# Patient Record
Sex: MALE | Race: WHITE
[De-identification: names, ages, dates, MRNs, and addresses within clinical notes are randomized per-mention and may not be internally consistent; named-entity substitution may affect disease eponyms.]

---

## 2017-02-18 ENCOUNTER — HOSPITAL ENCOUNTER (EMERGENCY)
Dept: HOSPITAL 39 - ER | Age: 32
LOS: 1 days | Discharge: HOME | End: 2017-02-19
Payer: COMMERCIAL

## 2017-02-18 VITALS — DIASTOLIC BLOOD PRESSURE: 82 MMHG | TEMPERATURE: 98.2 F | SYSTOLIC BLOOD PRESSURE: 144 MMHG

## 2017-02-18 VITALS — OXYGEN SATURATION: 98 %

## 2017-02-18 DIAGNOSIS — Z87.891: ICD-10-CM

## 2017-02-18 DIAGNOSIS — Z88.7: ICD-10-CM

## 2017-02-18 DIAGNOSIS — R07.89: Primary | ICD-10-CM

## 2017-02-18 DIAGNOSIS — Z88.6: ICD-10-CM

## 2017-02-18 DIAGNOSIS — F41.8: ICD-10-CM

## 2017-02-18 RX ADMIN — ASPIRIN 325 MG ORAL TABLET ONE MG: 325 PILL ORAL at 22:05

## 2017-02-18 NOTE — RAD
EXAM DESCRIPTION: 



Chest,1 View



CLINICAL HISTORY: 



Anterior chest wall pain



COMPARISON: 



None



FINDINGS: 



Cardiac silhouette is within normal limits. There is no focal

parenchymal or pleural disease. There is no acute osseous process

visualized.



IMPRESSION: 



No evidence of acute cardiopulmonary disease.



Electronically signed by:  Jim Farley MD  2/18/2017 11:17 PM

CST

## 2017-02-18 NOTE — ED.PDOC
History of Present Illness





- General


Chief Complaint: Chest Pain/MI


Stated Complaint: chest pain


Time Seen by Provider: 02/18/17 22:52


Source: patient


Exam Limitations: no limitations





- History of Present Illness


Initial Comments: 


CARLOS ALBERTO 30 y/o male with no chronic medical problem stated he had been under lots 

of stress recently with his ex girlfriend whom he had a son today when he went 

to visit his son today had an argument and doesnt want her to see his son.And 

mentioned also that she is about to move to [place] and got stress out then had 

burning chest pain symptoms non radiating no diaphoresis no sob no nausea,

vomiting.He also stated that they are not legally .


Timing/Duration: 1-3 hours


Severity/Quality: burning


Location: central


Chest Pain Radiation: no radiation


Activities at Onset: emotional stress


Prior Chest Pain/Cardiac Workup: no prior chest pain


Improving Factors: nothing, other - he was chest pain free in er


Worsening Factors: other - as per hpi-stress out


Nitro Today/Relief: no nitro taken today


Aspirin Treatment Today: provided by ED


Associated Symptoms: denies symptoms


Allergies/Adverse Reactions: 


Allergies





Influenza Vaccines Allergy (Verified 02/18/17 22:54)


 


Morphine Allergy (Verified 02/18/17 22:54)


 








Home Medications: 


Ambulatory Orders





NK [NK]  05/14/16 











Review of Systems





- Review of Systems


Constitutional: States: no symptoms reported


EENTM: States: no symptoms reported


Respiratory: States: no symptoms reported


Cardiology: States: see HPI


Gastrointestinal/Abdominal: States: no symptoms reported


Genitourinary: States: no symptoms reported


Musculoskeletal: States: no symptoms reported


Skin: States: no symptoms reported


Neurological: States: no symptoms reported


Endocrine: States: no symptoms reported


Hematologic/Lymphatic: States: no symptoms reported





Past Medical History (General)





- Patient Medical History


Hx Seizures: No


Hx Stroke: No


Hx Dementia: No


Hx Asthma: No


Hx of COPD: No


Hx Cardiac Disorders: No


Hx Congestive Heart Failure: No


Hx Pacemaker: No


Hx Hypertension: No


Hx Thyroid Disease: No


Hx Diabetes: No


Hx Gastroesophageal Reflux: No


Hx Renal Disease: No


Hx Cancer: No


Hx of HIV: No


Hx Hepatitis C: No


Hx MRSA: No





- Vaccination History


Hx Tetanus, Diphtheria Vaccination: Yes


Hx Influenza Vaccination: Yes


Hx Pneumococcal Vaccination: No





- Social History


Hx Tobacco Use: Yes


Hx Chewing Tobacco Use: No


Hx Alcohol Use: No


Hx Substance Use: No


Hx Substance Use Treatment: No


Hx Depression: No


Hx Physical Abuse: No


Hx Emotional Abuse: No


Hx Suspected Abuse: No





- Female History


Patient Pregnant: No





Family Medical History





- Family History


  ** Mother


Family History: No Known


Living Status: Still Living


Hx Family;Other: schizophrenic, bioplar





Physical Exam





- Physical Exam


General Appearance: Alert, Anxious, No apparent distress


Eyes, Ears, Nose, Throat Exam: PERRL/EOMI, normal ENT inspection, TMs normal, 

pharynx normal


Neck: non-tender, full range of motion, supple


Respiratory: chest non-tender, lungs clear, normal breath sounds, no 

respiratory distress


Cardiovascular/Chest: normal peripheral pulses, regular rate, rhythm, no edema, 

no gallop, no JVD, no murmur


Peripheral Pulses: radial,right: 2+, radial,left: 2+


Gastrointestinal/Abdominal: normal bowel sounds, non tender, soft, no 

organomegaly, no pulsatile mass


Extremity: normal range of motion, non-tender


Neurologic: no motor/sensory deficits, alert, normal mood/affect, oriented x 3


Skin Exam: normal color, warm/dry


Lymphatic: no adenopathy





Progress





- Results/Orders


Results/Orders: 


 





02/18/17 22:18


IV Care:Saline Lock per Protoc QSHIFT 


Telemetry .ONCE 


Sodium Chloride 0.9% (Flush) [Saline Flush Syringe]   10 ml IV PRN PRN 


EKG Stat 


Pulse Ox Stat 





02/18/17 22:19


Pulse Oximetry Assessment DAILY 








 Laboratory Results











WBC  9.9 K/mm3 (4.8-10.8)   02/18/17  22:38    


 


RBC  5.02 M/mm3 (4.70-6.10)   02/18/17  22:38    


 


Hgb  16.0 gm/dL (14.0-18.0)   02/18/17  22:38    


 


Hct  47.5 % (42.0-52.0)   02/18/17  22:38    


 


MCV  94.8 fl (80.0-94.0)  H  02/18/17  22:38    


 


MCH  31.9 pg (27.0-31.0)  H  02/18/17  22:38    


 


MCHC  33.7 g/dL (33.0-37.0)   02/18/17  22:38    


 


RDW  12.4 % (11.5-14.5)   02/18/17  22:38    


 


Plt Count  189 K/mm3 (130-400)   02/18/17  22:38    


 


MPV  9.9 fl (7.40-10.4)   02/18/17  22:38    


 


Absolute Neuts (auto)  6.20 K/uL (1.8-6.8)   02/18/17  22:38    


 


Absolute Lymphs (auto)  2.60 K/uL (1.0-3.4)   02/18/17  22:38    


 


Absolute Monos (auto)  0.90 K/uL (0.2-0.8)  H  02/18/17  22:38    


 


Absolute Eos (auto)  0.10 K/uL (0.0-0.4)   02/18/17  22:38    


 


Absolute Basos (auto)  0.10 K/uL (0.0-0.1)   02/18/17  22:38    


 


Neutrophils %  62.8 % (42.0-78.0)   02/18/17  22:38    


 


Lymphocytes %  26.1 % (20.0-50.0)   02/18/17  22:38    


 


Monocytes %  9.4 % (2.0-9.0)  H  02/18/17  22:38    


 


Eosinophils %  0.8 % (1.0-5.0)  L  02/18/17  22:38    


 


Basophils %  0.9 % (0.0-2.0)   02/18/17  22:38    


 


PT  11.4 SECONDS (9.4-12.5)   02/18/17  22:38    


 


INR  1.010   02/18/17  22:38    


 


PTT (SP)  34.0 SECONDS (25.1-36.5)   02/18/17  22:38    


 


Sodium  139 mmol/L (135-145)   02/18/17  22:38    


 


Potassium  3.7 mmol/L (3.6-5.0)   02/18/17  22:38    


 


Chloride  101 mmol/L (101-111)   02/18/17  22:38    


 


Carbon Dioxide  30 mmol/L (21-31)   02/18/17  22:38    


 


Anion Gap  11.7  (12-18)  L  02/18/17  22:38    


 


BUN  11 mg/dL (7-18)   02/18/17  22:38    


 


Creatinine  1.22 mg/dL (0.6-1.3)   02/18/17  22:38    


 


BUN/Creatinine Ratio  9.0  (10-20)  L  02/18/17  22:38    


 


Random Glucose  99 mg/dL ()   02/18/17  22:38    


 


Serum Osmolality  277.0 mOsm/L (275-295)   02/18/17  22:38    


 


Calcium  9.6 mg/dL (8.4-10.2)   02/18/17  22:38    


 


Magnesium  1.9 mg/dL (1.8-2.5)   02/18/17  22:38    


 


Creatine Kinase  144 IU/L ()   02/18/17  22:38    


 


CK-MB (CK-2)  1.5 ng/mL (0.0-4.4)   02/18/17  22:38    


 


CK-MB (CK-2) %  Not Reportable   02/18/17  22:38    


 


Troponin I  < 0.02 ng/mL (0.01-0.05)   02/18/17  22:38    


 


B-Natriuretic Peptide  < 5.0 pg/ml (0-100)   02/18/17  22:38    


 


Urine Opiates Screen  Negative ng/mL (2000)   02/18/17  22:35    


 


Urine Barbiturates  Negative ng/mL (200)   02/18/17  22:35    


 


Ur Phencyclidine Scrn  Negative ng/mL (25)   02/18/17  22:35    


 


U Amphetamin/Meth Scrn  Negative ng/mL (1000)   02/18/17  22:35    


 


U Benzodiazepines Scrn  Negative ng/mL (200)   02/18/17  22:35    


 


U Cocaine Metab Screen  Negative ng/mL (300)   02/18/17  22:35    


 


U Cannabinoids Screen  Negative ng/mL (50)   02/18/17  22:35    














- EKG/XRAY/CT


EKG: Sinus, no ST T wave changes


Comments: no ischemic or changes suggestive of myocardial injury


XRAY: chest - no acute abnormality





Departure





- Departure


Clinical Impression: 


 Chest pain due to psychological stress, Situational anxiety


Time of Disposition: 23:53


Disposition: Discharge to Home or Self Care


Condition: Good


Departure Forms:  ED Discharge - Pt. Copy, Patient Portal Self Enrollment


Instructions:  DI for Anxiety -- Adult, DI for Atypical Chest Pain


Home Medications: 


Ambulatory Orders





NK [NK]  05/14/16 








Additional Instructions: 


NEED TO SEE A FAMILY COUNSELOR;RETURN TO EMERGENCY ROOM AS NEEDED

## 2017-02-19 RX ADMIN — ASPIRIN 325 MG ORAL TABLET ONE: 325 PILL ORAL at 00:06

## 2019-02-09 ENCOUNTER — HOSPITAL ENCOUNTER (EMERGENCY)
Dept: HOSPITAL 39 - ER | Age: 34
Discharge: HOME | End: 2019-02-09
Payer: COMMERCIAL

## 2019-02-09 VITALS — SYSTOLIC BLOOD PRESSURE: 122 MMHG | DIASTOLIC BLOOD PRESSURE: 83 MMHG

## 2019-02-09 VITALS — TEMPERATURE: 97.3 F | OXYGEN SATURATION: 100 %

## 2019-02-09 DIAGNOSIS — F17.200: ICD-10-CM

## 2019-02-09 DIAGNOSIS — R07.0: ICD-10-CM

## 2019-02-09 DIAGNOSIS — R06.02: ICD-10-CM

## 2019-02-09 DIAGNOSIS — F45.8: Primary | ICD-10-CM

## 2019-02-09 DIAGNOSIS — Z88.7: ICD-10-CM

## 2019-02-09 DIAGNOSIS — F41.9: ICD-10-CM

## 2019-02-09 DIAGNOSIS — Z88.5: ICD-10-CM

## 2019-02-09 NOTE — ED.PDOC
History of Present Illness





- General


Chief Complaint: GI Problem


Stated Complaint: swollowed a ring, feels short of breath


Time Seen by Provider: 02/09/19 22:52


Source: patient


Exam Limitations: no limitations





- History of Present Illness


Initial Comments: 


patient comes in today for possible foreign body in his throat.  Patient states 

he was playing with his daughter and one of her rings popped off of her finger. 

He felt it hit his teeth and is worried that he may have swallowed it.  He had 

no choking or shortness of breath that time.  However, they couldn't find the 

reading and his throat was starting to hurt.  Patient admits that he does have 

some anxiety as it would not is not sure if that was causing to feel a little 

short of breath but he thought he should come up and get checked out.  The 

patient was sick the last 3 days with sore throat, body aches, and some 

congestion.  Patient states he is actually starting to feel better from that 

standpoint.  He is otherwise healthy and has no other past medical history.  He 

is a smoker.





Timing/Duration: 1/2 hour


Severity: mild


Improving Factors: nothing


Worsening Factors: nothing


Associated Symptoms: other - sore throat


Allergies/Adverse Reactions: 


Allergies





Influenza Vaccines Allergy (Verified 02/18/17 22:54)


   


Morphine Allergy (Verified 02/18/17 22:54)


   








Home Medications: 


Ambulatory Orders





NK  05/14/16 











Review of Systems





- Review of Systems


Constitutional: Denies: chills, fever


EENTM: States: throat pain.  Denies: ear pain, nose pain


Respiratory: States: no symptoms reported.  Denies: cough, short of breath, 

wheezing


Cardiology: States: no symptoms reported


Gastrointestinal/Abdominal: States: no symptoms reported





Past Medical History (General)





- Patient Medical History


Hx Seizures: No


Hx Stroke: No


Hx Dementia: No


Hx Asthma: No


Hx of COPD: No


Hx Cardiac Disorders: No


Hx Congestive Heart Failure: No


Hx Pacemaker: No


Hx Hypertension: No


Hx Thyroid Disease: No


Hx Diabetes: No


Hx Gastroesophageal Reflux: No


Hx Renal Disease: No


Hx Cancer: No


Hx of HIV: No


Hx Hepatitis C: No


Hx MRSA: No





- Vaccination History


Hx Tetanus, Diphtheria Vaccination: Yes


Hx Influenza Vaccination: Yes


Hx Pneumococcal Vaccination: No





- Social History


Hx Tobacco Use: Yes


Hx Chewing Tobacco Use: No


Hx Alcohol Use: No


Hx Substance Use: No


Hx Substance Use Treatment: No


Hx Depression: No


Hx Physical Abuse: No


Hx Emotional Abuse: No


Hx Suspected Abuse: No





- Female History


Patient Pregnant: No





Family Medical History





- Family History


  ** Mother


Family History: No Known


Living Status: Still Living


Hx Family;Other: schizophrenic, bioplar





Physical Exam





- Physical Exam


General Appearance: Alert, Comfortable, No apparent distress


Ears, Nose, Throat: nasal congestion, pharyngeal erythema, tonsillar exudate, 

tonsillar swelling


Neck: non-tender, full range of motion, supple, lymphadenopathy (R), 

lymphadenopathy (L), other - no crepitus


Respiratory: chest non-tender, lungs clear, normal breath sounds


Cardiovascular/Chest: normal peripheral pulses, regular rate, rhythm, no edema, 

no gallop, no murmur


Gastrointestinal/Abdominal: normal bowel sounds, non tender, soft





Progress





- Results/Orders


Results/Orders: 





Xray shows no FB


Strep was negative





Departure





- Departure


Clinical Impression: 


 Globus sensation





Disposition: Discharge to Home or Self Care


Condition: Good


Departure Forms:  ED Discharge - Pt. Copy, Patient Portal Self Enrollment


Referrals: 


Philippe Sanchez MD [Referring] - 1-2 Weeks


Home Medications: 


Ambulatory Orders





NK  05/14/16

## 2019-02-09 NOTE — RAD
Two views of the soft tissues of the neck



HISTORY: Evaluate for foreign body



COMPARISON: None



FINDINGS:

No radiopaque foreign bodies are identified. Prevertebral soft

tissues normal in thickness. Upper airway is clear. Osseous

structures are unremarkable.



IMPRESSION:

No radiopaque foreign bodies.



Electronically signed by:  Kris Sabillon DO  2/9/2019 11:33 PM

[facility] Workstation: [id number]

## 2019-08-27 ENCOUNTER — HOSPITAL ENCOUNTER (EMERGENCY)
Dept: HOSPITAL 39 - ER | Age: 34
Discharge: HOME | End: 2019-08-27
Payer: COMMERCIAL

## 2019-08-27 VITALS — DIASTOLIC BLOOD PRESSURE: 74 MMHG | OXYGEN SATURATION: 98 % | SYSTOLIC BLOOD PRESSURE: 112 MMHG | TEMPERATURE: 98 F

## 2019-08-27 DIAGNOSIS — R09.89: Primary | ICD-10-CM

## 2019-08-27 DIAGNOSIS — Z87.891: ICD-10-CM

## 2019-08-27 DIAGNOSIS — M54.2: ICD-10-CM

## 2019-08-27 DIAGNOSIS — Z88.5: ICD-10-CM

## 2019-08-27 DIAGNOSIS — Z88.7: ICD-10-CM

## 2019-08-27 DIAGNOSIS — F41.9: ICD-10-CM

## 2019-08-27 DIAGNOSIS — R07.0: ICD-10-CM

## 2019-08-27 PROCEDURE — 70360 X-RAY EXAM OF NECK: CPT

## 2019-08-27 NOTE — ED.PDOC
History of Present Illness





- General


Chief Complaint: ENT Problem


Stated Complaint: throat pain


Time Seen by Provider: 08/27/19 02:36


Exam Limitations: no limitations





- History of Present Illness


Initial Comments: 





HE IS AFRAID THAT HE MIGHT HAVE SWALLOWED A TOOTHPICK.  EARLIER HE ATE A PIECE 

OF CAKE AND NOTED THAT A TOOTHPICK WAS ON THE CAKE.  HE EXAMINED THE TOOTHPICK 

AND IT SEEMED INTACT.  ABOUT THIRTY MINUTES LATER HE HAD A SORE THROAT AND NOW 

HE IS HERE AT THE ED.  NO DROOLING, NO STRIDOR AND NO WHEEZING.  HE SUFFERS OF 

ANXIETY. 


Timing/Duration: gradual


EENT Location: throat


Prearrival Treatment: no prearrival treatment


Improving Factors: nothing


Worsening Factors: nothing


Associated Symptoms: denies symptoms


Allergies/Adverse Reactions: 


Allergies





Influenza Vaccines Allergy (Verified 02/18/17 22:54)


   


Morphine Allergy (Verified 02/18/17 22:54)


   





Home Medications: 


Ambulatory Orders





NK  05/14/16 











Review of Systems





- Review of Systems


Constitutional: States: no symptoms reported


EENTM: States: throat pain


Respiratory: States: no symptoms reported


Cardiology: States: no symptoms reported


Gastrointestinal/Abdominal: States: no symptoms reported


Genitourinary: States: no symptoms reported


Musculoskeletal: States: no symptoms reported


Skin: States: no symptoms reported


Neurological: States: anxiety





Past Medical History (General)





- Patient Medical History


Hx Seizures: No


Hx Stroke: No


Hx Dementia: No


Hx Asthma: No


Hx of COPD: No


Hx Cardiac Disorders: No


Hx Congestive Heart Failure: No


Hx Pacemaker: No


Hx Hypertension: No


Hx Thyroid Disease: No


Hx Diabetes: No


Hx Gastroesophageal Reflux: No


Hx Renal Disease: No


Hx Cancer: No


Hx of HIV: No


Hx Hepatitis C: No


Hx MRSA: No


Surgical History: appendectomy





- Vaccination History


Hx Tetanus, Diphtheria Vaccination: Yes


Hx Influenza Vaccination: Yes


Hx Pneumococcal Vaccination: No





- Social History


Hx Tobacco Use: Yes


Hx Chewing Tobacco Use: No


Hx Alcohol Use: No


Hx Substance Use: No


Hx Substance Use Treatment: No


Hx Depression: No


Hx Physical Abuse: No


Hx Emotional Abuse: No


Hx Suspected Abuse: No





- Female History


Patient Pregnant: No





- Triage Comment


ED Triage Comment: thinks possibly swallowed toothpick that was in piece of 

cake. No difficulty swallowing presently per pt, just "hurts" at side of neck.





Family Medical History





- Family History


  ** Mother


Family History: No Known


Living Status: Still Living


Hx Family;Other: schizophrenic, bioplar





Physical Exam





- Physical Exam


General Appearance: Alert, Anxious, Well Developed, Well Groomed, Well Hydrated,

Well Nourished


Eye Exam: bilateral normal


Ear Exam: bilateral ear: auricle normal


Nasal Exam: normal inspection


Throat Exam: normal mouth inspection, pharynx normal


Cardiovascular/Respiratory: regular rate, rhythm, no M/R/G


Abdominal Exam: non-tender, no organomegaly


Neurologic: CNs II-XII nml as tested, alert


Skin Exam: normal color





Progress





- Progress


Progress: 





08/27/19 02:40


X RAY IS NEGATIVE FOR FOREIGN BODY 





Departure





- Departure


Clinical Impression: 


 Anxiety, Sensation of foreign body in larynx





Time of Disposition: 02:41


Disposition: Discharge to Home or Self Care


Condition: Good


Departure Forms:  ED Discharge - Pt. Copy, Patient Portal Self Enrollment


Instructions:  Anxiety, Adult (DC)


Home Medications: 


Ambulatory Orders





NK  05/14/16

## 2019-08-27 NOTE — RAD
Neck soft tissue two view on 8/27/2019



CLINICAL INDICATION: Patient possibly swallowed tooth pick



COMPARISON: 2/9/2019



FINDINGS: There is slight reversal of the normal cervical

lordosis. There is no prevertebral soft tissue swelling. The

epiglottis and airway is unremarkable. No definite radiopaque

foreign body is noted. If there is high clinical concern for a

wooden foreign body consider CT.



IMPRESSION: No acute abnormality.



Electronically signed by:  Uzair Chin  8/27/2019 2:19 AM

[facility] Workstation: [id number]

## 2019-09-06 ENCOUNTER — HOSPITAL ENCOUNTER (EMERGENCY)
Dept: HOSPITAL 39 - ER | Age: 34
LOS: 1 days | Discharge: HOME | End: 2019-09-07
Payer: COMMERCIAL

## 2019-09-06 VITALS — OXYGEN SATURATION: 100 % | TEMPERATURE: 97.3 F

## 2019-09-06 DIAGNOSIS — H61.23: ICD-10-CM

## 2019-09-06 DIAGNOSIS — Z87.891: ICD-10-CM

## 2019-09-06 DIAGNOSIS — H65.03: Primary | ICD-10-CM

## 2019-09-06 DIAGNOSIS — Z88.7: ICD-10-CM

## 2019-09-06 DIAGNOSIS — Z88.5: ICD-10-CM

## 2019-09-07 VITALS — DIASTOLIC BLOOD PRESSURE: 63 MMHG | SYSTOLIC BLOOD PRESSURE: 105 MMHG

## 2019-09-07 NOTE — ED.PDOC
History of Present Illness





- General


Chief Complaint: ENT Problem


Stated Complaint: ears clogged, left ear achy


Time Seen by Provider: 09/06/19 23:58


Source: patient





- History of Present Illness


Initial Comments: 





33 yo male who presents with cc of BL ear fullness and decreased hearing.  

Ongoing for several days now, worse today, reports hx of similar problem many 

times in past.  Has been told he has earwax buildup and occlusion and is 

supposed to regularly have his ears cleaned by ENT but admits to not keeping up 

with this for quite some time.  Reports muffled/decreased moderate hearing loss 

BL.  Tried Debrox and gentle ear canal flushes at home with no relief.  Reports 

mild pain to right ear, none to left.  No fevers, no other issues.


Allergies/Adverse Reactions: 


Allergies





Influenza Vaccines Allergy (Verified 02/18/17 22:54)


   


Morphine Allergy (Verified 02/18/17 22:54)


   





Home Medications: 


Ambulatory Orders





Amoxicillin 875 mg PO BID #10 tab 09/07/19 











Review of Systems





- Review of Systems


Review of Systems: 





09/07/19 00:01


see HPI


All other Systems: Reviewed and Negative





Past Medical History (General)





- Patient Medical History


Hx Seizures: No


Hx Stroke: No


Hx Dementia: No


Hx Asthma: No


Hx of COPD: No


Hx Cardiac Disorders: No


Hx Congestive Heart Failure: No


Hx Pacemaker: No


Hx Hypertension: No


Hx Thyroid Disease: No


Hx Diabetes: No


Hx Gastroesophageal Reflux: No


Hx Renal Disease: No


Hx Cancer: No


Hx of HIV: No


Hx Hepatitis C: No


Hx MRSA: No





- Vaccination History


Hx Tetanus, Diphtheria Vaccination: Yes


Hx Influenza Vaccination: Yes


Hx Pneumococcal Vaccination: No





- Social History


Hx Tobacco Use: Yes


Hx Chewing Tobacco Use: No


Hx Alcohol Use: No


Hx Substance Use: No


Hx Substance Use Treatment: No


Hx Depression: No


Hx Physical Abuse: No


Hx Emotional Abuse: No


Hx Suspected Abuse: No





- Female History


Patient Pregnant: No





Family Medical History





- Family History


  ** Mother


Family History: No Known


Living Status: Still Living


Hx Family;Other: schizophrenic, bioplar





Physical Exam





- Physical Exam


General Appearance: Alert, No apparent distress


Eye Exam: bilateral normal


Ear Exam: bilateral ear: auricle normal, other - copious earwax noted BL 

obscuring visualization of TMs


Nasal Exam: normal inspection


Throat Exam: normal mouth inspection, pharynx normal


Neck: non-tender, full range of motion, supple


Cardiovascular/Respiratory: regular rate, rhythm, normal breath sounds, no 

respiratory distress


Abdominal Exam: non-tender, no organomegaly


Neurologic: no motor/sensory deficits, alert, normal mood/affect, oriented x 3


Skin Exam: normal color, warm/dry





Progress





- Progress


Progress: 





09/07/19 00:02


BL cerumen impaction


-causing moderate BL hearing loss acutely


-will try to flush out with use of cerumen softener and warm water flushes





09/07/19 01:09


-Flushed BL ears copiously - was able to fully clear all cerumen from right ear 

and restore to normal hearing.  Left ear - applied Debrox and let sit for 5 

minutes followed by copious gentle flushing of warm water and able to clear 

large amount of cerumen and return to near normal hearing and visualize approx 

40% of left TM which appeared slightly erythematous as did the right.  Will dc 

flushes here, send home on amoxicillin 875 mg BID x5 days.  Advised continued 

Debrox and gentle flushing attempts at home tomorrow.  F/u closely with PCP next

week and will likely need ENT referral to establish care.





Departure





- Departure


Clinical Impression: 


 Impacted cerumen of both ears





Otitis media


Qualifiers:


 Otitis media type: serous Chronicity: acute Laterality: bilateral Recurrence: 

non-recurrent Qualified Code(s): H65.03 - Acute serous otitis media, bilateral





Time of Disposition: 00:15


Disposition: Discharge to Home or Self Care


Condition: Good


Departure Forms:  ED Discharge - Pt. Copy, Patient Portal Self Enrollment


Instructions:  Ear Wax Impaction (DC)


Prescriptions: 


Amoxicillin 875 mg PO BID #10 tab


Home Medications: 


Ambulatory Orders





Amoxicillin 875 mg PO BID #10 tab 09/07/19

## 2020-11-20 ENCOUNTER — HOSPITAL ENCOUNTER (EMERGENCY)
Dept: HOSPITAL 39 - ER | Age: 35
Discharge: HOME | End: 2020-11-20
Payer: COMMERCIAL

## 2020-11-20 VITALS — TEMPERATURE: 98 F | DIASTOLIC BLOOD PRESSURE: 93 MMHG | OXYGEN SATURATION: 100 % | SYSTOLIC BLOOD PRESSURE: 126 MMHG

## 2020-11-20 DIAGNOSIS — Z87.891: ICD-10-CM

## 2020-11-20 DIAGNOSIS — Z88.5: ICD-10-CM

## 2020-11-20 DIAGNOSIS — B35.3: ICD-10-CM

## 2020-11-20 DIAGNOSIS — Z88.7: ICD-10-CM

## 2020-11-20 DIAGNOSIS — L02.611: Primary | ICD-10-CM

## 2020-11-20 NOTE — ED.PDOC
History of Present Illness





- General


Chief Complaint: Skin/Abrasion/Tear


Time Seen by Provider: 11/20/20 06:20


Source: patient


Exam Limitations: no limitations





- History of Present Illness


Initial Comments: 





The patient is a 35-year-old  male presented to the emergency room 

secondary to a small abscess forming in the dorsal aspect of the right distal 

lateral foot just above the metatarsal phalangeal joint #5.  He has been having 

little bit of pain in the area for the last for 5 days.  He does have 

significant athlete's foot bilaterally.  No fever.  No real extending erythema. 

No significant pain with movement of the underlying digit to indicate infection 

of the bone or joint.


Timing/Duration: gone - 48 hours, other


Severity: moderate


Worsening Factors: nothing


Associated Symptoms: denies symptoms


Allergies/Adverse Reactions: 


Allergies





Influenza Vaccines Allergy (Verified 02/18/17 22:54)


   


Morphine Allergy (Verified 02/18/17 22:54)


   





Home Medications: 


Ambulatory Orders





Amoxicillin 875 mg PO BID #10 tab 09/07/19 


Ciprofloxacin [Cipro] 500 mg PO BID #20 tab 11/20/20 


Sulfa/Trimeth 800/160 (Ds) Tab [Bactrim DS Tab] 1 ea PO BID #20 tab 11/20/20 











Review of Systems





- Review of Systems


Constitutional: States: no symptoms reported


EENTM: States: no symptoms reported


Respiratory: States: no symptoms reported


Cardiology: States: no symptoms reported


Gastrointestinal/Abdominal: States: no symptoms reported


Genitourinary: States: no symptoms reported


Musculoskeletal: States: no symptoms reported


Skin: States: see HPI


Neurological: States: no symptoms reported


Endocrine: States: no symptoms reported


All other Systems: No Change from Baseline





Past Medical History (General)





- Patient Medical History


Hx Seizures: No


Hx Stroke: No


Hx Dementia: No


Hx Asthma: No


Hx of COPD: No


Hx Cardiac Disorders: No


Hx Congestive Heart Failure: No


Hx Pacemaker: No


Hx Hypertension: No


Hx Thyroid Disease: No


Hx Diabetes: No


Hx Gastroesophageal Reflux: No


Hx Renal Disease: No


Hx Cancer: No


Hx of HIV: No


Hx Hepatitis C: No


Hx MRSA: No





- Vaccination History


Hx Tetanus, Diphtheria Vaccination: Yes


Hx Influenza Vaccination: Yes


Hx Pneumococcal Vaccination: No





- Social History


Hx Tobacco Use: Yes


Hx Chewing Tobacco Use: No


Hx Alcohol Use: No


Hx Substance Use: No


Hx Substance Use Treatment: No


Hx Depression: No


Hx Physical Abuse: No


Hx Emotional Abuse: No


Hx Suspected Abuse: No





- Female History


Patient Pregnant: No





Family Medical History





- Family History


  ** Mother


Family History: No Known


Living Status: Still Living


Hx Family;Other: schizophrenic, bioplar





Physical Exam





- Physical Exam


General Appearance: Alert, Comfortable, No apparent distress


Eye Exam: bilateral normal


Ears, Nose, Throat: hearing grossly normal, normal pharynx


Respiratory: no respiratory distress, no accessory muscle use


Cardiovascular/Chest: normal peripheral pulses, no edema


Peripheral Pulses: dorsalis pedis,right: 2+, dorsalis pedis,left: 2+


Rectal Exam: deferred


Extremity: normal range of motion, no pedal edema, no calf tenderness, normal 

capillary refill


Neurologic: CNs II-XII nml as tested, alert, normal mood/affect, oriented x 3


Skin Exam: normal color - See history of present illness


Comments: 





                               Vital Signs - 24 hr











  11/20/20





  06:24


 


Temperature 98.0 F


 


Pulse Rate [ 93 H





monitor] 


 


Respiratory 20





Rate 


 


Blood Pressure 126/93





[Left Arm] 


 


O2 Sat by Pulse 100





Oximetry 














Progress





- Progress


Progress: 





11/20/20 06:40


The patient is a 35-year-old  male presents emergency room secondary to

a small abscess forming to the dorsal lateral distal aspect of the right foot 

over the last couple of days.  This is likely due to skin breaks caused by his 

athlete's foot.  We were able to unroof it and express a small amount of pus 

which was cultured.  He does need to wash it at least 3 times daily with an 

antibacterial soap and water.  Afterwards he needs to cover it with Neosporin 

and a Band-Aid.  He also needs to use an antifungal foot powder or foot spray 3-

4 times daily  between his toes. he needs to wash his socks on high heat as 

well.  He needs to be reevaluated by his primary care doctor in 4 or 5 days to 

make sure the area is healing properly.  If not then further evaluation may be 

warranted.  For now he is going to be placed on double coverage of Bactrim and 

ciprofloxacin for treatment of a small abscess site.  ER warnings are given.





debra torres [phone]





Departure





- Departure


Clinical Impression: 


 Foot abscess, right





Disposition: Discharge to Home or Self Care


Condition: Fair


Departure Forms:  ED Discharge - Pt. Copy, Patient Portal Self Enrollment


Instructions:  DI for Abrasion, Abscess Incision and Drainage (DC)


Diet: regular diet


Activity: increase activity as tolerated


Referrals: 


Bridgette Delgado NP [Primary Care Provider] - 1-2 Weeks


Prescriptions: 


Sulfa/Trimeth 800/160 (Ds) Tab [Bactrim DS Tab] 1 ea PO BID #20 tab


Ciprofloxacin [Cipro] 500 mg PO BID #20 tab


Home Medications: 


Ambulatory Orders





Amoxicillin 875 mg PO BID #10 tab 09/07/19 


Ciprofloxacin [Cipro] 500 mg PO BID #20 tab 11/20/20 


Sulfa/Trimeth 800/160 (Ds) Tab [Bactrim DS Tab] 1 ea PO BID #20 tab 11/20/20 








Additional Instructions: 


The patient is a 35-year-old  male presents emergency room secondary to

a small abscess forming to the dorsal lateral distal aspect of the right foot 

over the last couple of days.  This is likely due to skin breaks caused by his 

athlete's foot.  We were able to unroof it and express a small amount of pus 

which was cultured.  He does need to wash it at least 3 times daily with an 

antibacterial soap and water.  Afterwards he needs to cover it with Neosporin 

and a Band-Aid.  He also needs to use an antifungal foot powder or foot spray 3-

4 times daily  between his toes. he needs to wash his socks on high heat as 

well.  He needs to be reevaluated by his primary care doctor in 4 or 5 days to 

make sure the area is healing properly.  If not then further evaluation may be 

warranted.  For now he is going to be placed on double coverage of Bactrim and 

ciprofloxacin for treatment of a small abscess site.  ER warnings are given.